# Patient Record
Sex: FEMALE | Race: BLACK OR AFRICAN AMERICAN | NOT HISPANIC OR LATINO | Employment: STUDENT | URBAN - METROPOLITAN AREA
[De-identification: names, ages, dates, MRNs, and addresses within clinical notes are randomized per-mention and may not be internally consistent; named-entity substitution may affect disease eponyms.]

---

## 2024-09-21 ENCOUNTER — HOSPITAL ENCOUNTER (EMERGENCY)
Facility: CLINIC | Age: 19
Discharge: HOME | End: 2024-09-21
Attending: EMERGENCY MEDICINE | Admitting: EMERGENCY MEDICINE
Payer: COMMERCIAL

## 2024-09-21 ENCOUNTER — APPOINTMENT (OUTPATIENT)
Dept: GENERAL RADIOLOGY | Facility: CLINIC | Age: 19
End: 2024-09-21
Attending: PHYSICIAN ASSISTANT
Payer: COMMERCIAL

## 2024-09-21 VITALS
RESPIRATION RATE: 15 BRPM | HEART RATE: 92 BPM | DIASTOLIC BLOOD PRESSURE: 75 MMHG | OXYGEN SATURATION: 98 % | TEMPERATURE: 98 F | SYSTOLIC BLOOD PRESSURE: 120 MMHG

## 2024-09-21 DIAGNOSIS — M25.319 SHOULDER INSTABILITY: ICD-10-CM

## 2024-09-21 PROCEDURE — 73030 X-RAY EXAM OF SHOULDER: CPT | Mod: RT

## 2024-09-21 PROCEDURE — 99283 EMERGENCY DEPT VISIT LOW MDM: CPT | Performed by: EMERGENCY MEDICINE

## 2024-09-21 PROCEDURE — 73030 X-RAY EXAM OF SHOULDER: CPT | Mod: 26 | Performed by: RADIOLOGY

## 2024-09-21 ASSESSMENT — COLUMBIA-SUICIDE SEVERITY RATING SCALE - C-SSRS
1. IN THE PAST MONTH, HAVE YOU WISHED YOU WERE DEAD OR WISHED YOU COULD GO TO SLEEP AND NOT WAKE UP?: NO
6. HAVE YOU EVER DONE ANYTHING, STARTED TO DO ANYTHING, OR PREPARED TO DO ANYTHING TO END YOUR LIFE?: NO
2. HAVE YOU ACTUALLY HAD ANY THOUGHTS OF KILLING YOURSELF IN THE PAST MONTH?: NO

## 2024-09-21 ASSESSMENT — ACTIVITIES OF DAILY LIVING (ADL)
ADLS_ACUITY_SCORE: 33
ADLS_ACUITY_SCORE: 33

## 2024-09-21 NOTE — ED PROVIDER NOTES
"    [facility] DE[facility] ([facility])    9/21/24       ED PROVIDER NOTE    History     Chief Complaint   Patient presents with    Shoulder Injury     HPI  Charles Nation is a 18 year old female who presents to the ED for evaluation of shoulder difficulties.  She presents alone.  She notes over the last 2 to 3 days she has been experiencing instability sensation of the right shoulder joint.  She notes a \"popping\" sound intermittently over the last few days as well.  No injury to the shoulder.  She is right-hand dominant denies any numbness or tingling denies any fevers chest pain dyspnea or back pain.  She has not had shoulder problems in the past.  No history of overuse injury.  She is a student at the [employer].    Past Medical History  No past medical history on file.  No past surgical history on file.  No current outpatient medications on file.    Allergies   Allergen Reactions    Ceftriaxone     Seasonal Allergies     Watermelon [Citrullus Vulgaris]      Family History  No family history on file.  Social History       A complete review of systems was performed with pertinent positives and negatives noted in the HPI, and all other systems negative.    Physical Exam   BP: 120/75  Pulse: 92  Temp: 98  F (36.7  C)  Resp: 15  SpO2: 98 %  Physical Exam  GENERAL APPEARANCE: The patient is well developed, well appearing, and in no acute distress.  HEAD:  Normocephalic and atraumatic.   EENT: Voice normal.  NECK: Trachea is midline.No lymphadenopathy or tenderness.  LUNGS: Breath sounds are equal and clear bilaterally. No wheezes, rhonchi, or rales.  HEART: Regular rate and normal rhythm.    EXTREMITIES: Right shoulder without deformity.  She has full range of motion with abduction and abduction of the right shoulder noted on exam.   strength out of 5 bilaterally.  Radial pulse 2+ on the right.  No sensation tact distal aspect digits 1 through 5 right hand.  NEUROLOGIC: No focal sensory or " motor deficits are noted.  PSYCHIATRIC: The patient is awake, alert.  Appropriate mood and affect.  SKIN: Warm, dry, and well perfused. Good turgor.    Right shoulder without abnormality    ED Course, Procedures, & Data           Results for orders placed or performed during the hospital encounter of 09/21/24   XR Shoulder Right G/E 3 Views     Status: None    Narrative    EXAM: XR SHOULDER RIGHT G/E 3 VIEWS  LOCATION: [facility]  DATE: 9/21/2024    INDICATION: pain  COMPARISON: None.      Impression    IMPRESSION: Normal joint spaces and alignment. No fracture.     Medications - No data to display  Labs Ordered and Resulted from Time of ED Arrival to Time of ED Departure - No data to display  XR Shoulder Right G/E 3 Views   Final Result   IMPRESSION: Normal joint spaces and alignment. No fracture.             Critical care was not performed.     Medical Decision Making  The patient's presentation was of moderate complexity (an undiagnosed new problem with uncertain prognosis).    The patient's evaluation involved:  ordering and/or review of 1 test(s) in this encounter (see separate area of note for details)    The patient's management necessitated only low risk treatment.    Assessment & Plan    This is a 18-year-old female present with concerns for several days of right shoulder difficulties in the absence of any trauma, numbness, systemic symptoms.  She has reassuring vitals and presentation.  Exam shows no acute deformity of the shoulder is good range of motion with abduction and adduction.  She has intact strength in the upper extremities with intact radial pulse and sensation.  Differential considered includes possibility of rotator cuff arthropathy, strain, fracture dislocation amongst others.  Low suspicion fracture dislocation without trauma or significant decreased range of motion.  Initiate radiographs to further evaluate.  Radiographs obtained reviewed  without fracture or dislocation.  Jorge A with patient her symptoms do appear consistent with instability of the shoulder joint.  We discussed recommendations of following up with orthopedics.  Referral placed.  She is given sling for comfort.  We discussed light activity until follow-up.  We discussed return precautions.  Patient has no other questions or concerns at this time.  Red flag signs were addressed, and they were in agreement with the patient care plan provided.    Patient seen and discussed with attending physician , who agrees with my plan of care.    --    ED Attending Physician Attestation    I Paulino Park DO, cared for this patient with the Advanced Practice Provider (LATOYA). I personally provided a substantive portion of the care for this patient, including approving the care plan for the number and complexity of problems addressed and taking responsibility related to the risk of complications and/or morbidity or mortality of patient management. Please see the LATOYA's documentation for full details.          Paulino Park DO  Emergency Medicine      I have reviewed the nursing notes. I have reviewed the findings, diagnosis, plan and need for follow up with the patient.    There are no discharge medications for this patient.      Final diagnoses:   Shoulder instability     Sue Stevenson [facility] EMERGENCY DEPARTMENT  9/21/2024     Sue Stevenson PA-C  09/21/24 [id number]       Paulino Park DO  09/22/24 [id number]

## 2024-09-21 NOTE — ED TRIAGE NOTES
Pt ambulatory to triage with c/o right shoulder pain. Pt states that for the past few days she has been feeling her shoulder pop out of the socket. Pt is worried she may have dislocated her right shoulder.      Triage Assessment (Adult)       Row Name 09/21/24 7007          Triage Assessment    Airway WDL WDL        Respiratory WDL    Respiratory WDL WDL        Skin Circulation/Temperature WDL    Skin Circulation/Temperature WDL WDL        Cardiac WDL    Cardiac WDL WDL        Peripheral/Neurovascular WDL    Peripheral Neurovascular WDL WDL        Cognitive/Neuro/Behavioral WDL    Cognitive/Neuro/Behavioral WDL WDL

## 2024-09-22 NOTE — DISCHARGE INSTRUCTIONS
Here in the emergency room you have negative x-rays of your shoulder.  We discussed you may have some laxity instability in the shoulder joint.  I placed a referral to pelvic orthopedics.  You are given a sling for comfort.  He can use Tylenol ibuprofen heat and ice as needed.  We discussed activity as tolerated.    If you develop any new or worsening symptoms, is important to return right away to the emergency department for further evaluation and management.

## 2024-09-27 ENCOUNTER — PRE VISIT (OUTPATIENT)
Dept: ORTHOPEDICS | Facility: CLINIC | Age: 19
End: 2024-09-27

## 2024-09-27 ENCOUNTER — OFFICE VISIT (OUTPATIENT)
Dept: ORTHOPEDICS | Facility: CLINIC | Age: 19
End: 2024-09-27
Payer: COMMERCIAL

## 2024-09-27 DIAGNOSIS — M25.511 ACUTE PAIN OF RIGHT SHOULDER: Primary | ICD-10-CM

## 2024-09-27 DIAGNOSIS — M24.9 HYPERMOBILE JOINTS: ICD-10-CM

## 2024-09-27 PROCEDURE — 99203 OFFICE O/P NEW LOW 30 MIN: CPT | Performed by: FAMILY MEDICINE

## 2024-09-27 NOTE — LETTER
9/27/2024      RE: Charles Nation  [address]  [place] [address]     Dear Colleague,    Thank you for referring your patient, Charles Nation, to the [facility]. Please see a copy of my visit note below.    CHIEF COMPLAINT:  Pain of the Right Shoulder     HISTORY OF PRESENT ILLNESS  Ms. Nation is a pleasant 18 year old year old female who presents to clinic today with left shoulder victoria.  Charles explains that last weekend she thought she subluxed her shoulder.  She was sitting when she felt her shoulder feel like it slipped out of the joint.      She felt severe pain and inability to move her shoulder in any planes after this.  She has had little to no issues with her shoulders in the past.  She does however have a history of hypermobility diagnosed through a high Beighton score.  She was ruled out for Marfan syndrome however she was not tested with genetics for other connective tissue disorders or EDS.    She notes since being in the sling for a couple of days her shoulder is feeling improved and her motion has returned.  She only has pain with specific rotational activities.  She is hoping to learn additional treatment options that can help prevent this from happening again.    Onset: sudden  Location: left shoulder  Quality:  aching and dull  Duration: 9/21/24  Severity: 6/10 at worst  Timing:intermittent episodes   Modifying factors:  resting and non-use makes it better, movement and use makes it worse  Associated signs & symptoms: pain  Previous similar pain: No  Treatments to date:Tylenol PRN     Additional history: as documented    Review of Systems:  Have you recently had a a fever, chills, weight loss? No  Do you have any vision problems? No  Do you have any chest pain or edema? No  Do you have any shortness of breath or wheezing?  No  Do you have stomach problems? No  Do you have any numbness or focal weakness? No  Do you have diabetes? No  Do  you have problems with bleeding or clotting? No  Do you have an rashes or other skin lesions? No    MEDICAL HISTORY  There is no problem list on file for this patient.      No current outpatient medications on file.       Allergies   Allergen Reactions     Ceftriaxone      Seasonal Allergies      Watermelon [Citrullus Vulgaris]        No family history on file.    Additional medical/Social/Surgical histories reviewed in [facility] and updated as appropriate.    PHYSICAL EXAM  There were no vitals taken for this visit.    General  - normal appearance, in no obvious distress  Musculoskeletal -right shoulder  - inspection: normal bone and joint alignment, no obvious deformity, no scapular winging, no AC step-off  - palpation: Tender to palpation at anterior joint line of right shoulder, normal clavicle, non-tender AC  - ROM:  180 deg flexion   45 deg extension   150 deg abduction   95 deg ER   75 deg IR  - strength: 5/5  strength, 5/5 in all shoulder planes  - special tests:  (-) Speed's  (-) Neer  (-) Hawkin's  (-) [place]arena  (-) [place]'s  (-) apprehension  (-) subscap lift-off  Neuro  - no sensory or motor deficit, grossly normal coordination, normal muscle tone    IMAGING : X-ray shoulder right 4 views. Final results and radiologist's interpretation, available in the [facility] health record. Images were reviewed with the patient/family members in the office today. My personal interpretation of the performed imaging is no acute osseous abnormality or significant degenerative changes.  Shoulder joint is well aligned.     ASSESSMENT & PLAN  Ms. Nation is a 18 year old year old female who presents to clinic today with acute severe right pain and inability to move her shoulder in the setting of known hypermobility.    Possible shoulder subluxation versus other.  No considerable deficits on exam today aside from joint line tenderness.    Diagnosis:   Acute pain of right shoulder  History of hypermobility    We discussed treatment  options including relative rest for the shoulder and starting initially isometric strengthening followed by range of motion and progressive dynamic strengthening to improve rotator cuff strength.  We discussed strengthening rotator cuff is paramount for improving shoulder stability.  She will see one of our physical therapist at the [facility], near campus where she lives.  If she has any additional events, she can return and we can certainly discuss additional advanced imaging, however I do not believe that advanced imaging would not be needed at this time.    As pain is improved, we discussed OTC analgesics would be adequate and she can use heat/ice.    It was a pleasure seeing Charles today.    Srinivasa Madden DO, [facility]  Primary Care Sports Medicine      Again, thank you for allowing me to participate in the care of your patient.      Sincerely,    Srinivasa Madden DO

## 2024-09-27 NOTE — PROGRESS NOTES
CHIEF COMPLAINT:  Pain of the Right Shoulder     HISTORY OF PRESENT ILLNESS  Ms. Nation is a pleasant 18 year old year old female who presents to clinic today with left shoulder victoria.  Charles explains that last weekend she thought she subluxed her shoulder.  She was sitting when she felt her shoulder feel like it slipped out of the joint.      She felt severe pain and inability to move her shoulder in any planes after this.  She has had little to no issues with her shoulders in the past.  She does however have a history of hypermobility diagnosed through a high Beighton score.  She was ruled out for Marfan syndrome however she was not tested with genetics for other connective tissue disorders or EDS.    She notes since being in the sling for a couple of days her shoulder is feeling improved and her motion has returned.  She only has pain with specific rotational activities.  She is hoping to learn additional treatment options that can help prevent this from happening again.    Onset: sudden  Location: left shoulder  Quality:  aching and dull  Duration: 9/21/24  Severity: 6/10 at worst  Timing:intermittent episodes   Modifying factors:  resting and non-use makes it better, movement and use makes it worse  Associated signs & symptoms: pain  Previous similar pain: No  Treatments to date:Tylenol PRN     Additional history: as documented    Review of Systems:  Have you recently had a a fever, chills, weight loss? No  Do you have any vision problems? No  Do you have any chest pain or edema? No  Do you have any shortness of breath or wheezing?  No  Do you have stomach problems? No  Do you have any numbness or focal weakness? No  Do you have diabetes? No  Do you have problems with bleeding or clotting? No  Do you have an rashes or other skin lesions? No    MEDICAL HISTORY  There is no problem list on file for this patient.      No current outpatient medications on file.       Allergies   Allergen Reactions    Ceftriaxone      Seasonal Allergies     Watermelon [Citrullus Vulgaris]        No family history on file.    Additional medical/Social/Surgical histories reviewed in [facility] and updated as appropriate.    PHYSICAL EXAM  There were no vitals taken for this visit.    General  - normal appearance, in no obvious distress  Musculoskeletal -right shoulder  - inspection: normal bone and joint alignment, no obvious deformity, no scapular winging, no AC step-off  - palpation: Tender to palpation at anterior joint line of right shoulder, normal clavicle, non-tender AC  - ROM:  180 deg flexion   45 deg extension   150 deg abduction   95 deg ER   75 deg IR  - strength: 5/5  strength, 5/5 in all shoulder planes  - special tests:  (-) Speed's  (-) Neer  (-) Hawkin's  (-) Macarean  (-) [place]'s  (-) apprehension  (-) subscap lift-off  Neuro  - no sensory or motor deficit, grossly normal coordination, normal muscle tone    IMAGING : X-ray shoulder right 4 views. Final results and radiologist's interpretation, available in the [facility] health record. Images were reviewed with the patient/family members in the office today. My personal interpretation of the performed imaging is no acute osseous abnormality or significant degenerative changes.  Shoulder joint is well aligned.     ASSESSMENT & PLAN  Ms. Nation is a 18 year old year old female who presents to clinic today with acute severe right pain and inability to move her shoulder in the setting of known hypermobility.    Possible shoulder subluxation versus other.  No considerable deficits on exam today aside from joint line tenderness.    Diagnosis:   Acute pain of right shoulder  History of hypermobility    We discussed treatment options including relative rest for the shoulder and starting initially isometric strengthening followed by range of motion and progressive dynamic strengthening to improve rotator cuff strength.  We discussed strengthening rotator cuff is paramount for improving shoulder  stability.  She will see one of our physical therapist at the [facility], near campus where she lives.  If she has any additional events, she can return and we can certainly discuss additional advanced imaging, however I do not believe that advanced imaging would not be needed at this time.    As pain is improved, we discussed OTC analgesics would be adequate and she can use heat/ice.    It was a pleasure seeing Charles today.    Srinivasa Madden DO, CAQSM  Primary Care Sports Medicine

## 2024-10-06 ENCOUNTER — HEALTH MAINTENANCE LETTER (OUTPATIENT)
Age: 19
End: 2024-10-06

## 2024-10-10 ENCOUNTER — THERAPY VISIT (OUTPATIENT)
Dept: PHYSICAL THERAPY | Facility: CLINIC | Age: 19
End: 2024-10-10
Payer: COMMERCIAL

## 2024-10-10 DIAGNOSIS — M62.81 MUSCLE WEAKNESS (GENERALIZED): Primary | ICD-10-CM

## 2024-10-10 DIAGNOSIS — M25.511 ACUTE PAIN OF RIGHT SHOULDER: ICD-10-CM

## 2024-10-10 PROCEDURE — 97110 THERAPEUTIC EXERCISES: CPT | Mod: GP

## 2024-10-10 PROCEDURE — 97161 PT EVAL LOW COMPLEX 20 MIN: CPT | Mod: GP

## 2024-10-10 ASSESSMENT — ACTIVITIES OF DAILY LIVING (ADL)
WASHING_YOUR_BACK: 2
REACHING_FOR_SOMETHING_ON_A_HIGH_SHELF: 4
WASHING_YOUR_HAIR?: 2
AT_ITS_WORST?: 6
PUTTING_ON_A_SHIRT_THAT_BUTTONS_DOWN_THE_FRONT: 0
PUTTING_ON_AN_UNDERSHIRT_OR_A_PULLOVER_SWEATER: 3
PUTTING_ON_YOUR_PANTS: 0
PLEASE_INDICATE_YOR_PRIMARY_REASON_FOR_REFERRAL_TO_THERAPY:: SHOULDER
PUSHING_WITH_THE_INVOLVED_ARM: 4
TOUCHING_THE_BACK_OF_YOUR_NECK: 3
CARRYING_A_HEAVY_OBJECT_OF_10_POUNDS: 2
REMOVING_SOMETHING_FROM_YOUR_BACK_POCKET: 0
WHEN_LYING_ON_THE_INVOLVED_SIDE: 3
PLACING_AN_OBJECT_ON_A_HIGH_SHELF: 2

## 2024-10-10 NOTE — PROGRESS NOTES
"PHYSICAL THERAPY EVALUATION  Type of Visit: Evaluation              Subjective   Pt states that she has been having complaints of R shoulder subluxations. She went to the ER due to the significant amount of pain she was in and felt that her shoulder was dislocated. Per her reporting she is a 9/9 on the Beighton scale. She is a student at the [employer]      Presenting condition or subjective complaint: shoulder instability  Date of onset:      Relevant medical history: Depression; Dizziness; Mental Illness; Migraines or headaches; Pain at night or rest; Severe headaches   Dates & types of surgery: Appendectomy, 10/2020    Prior diagnostic imaging/testing results: X-ray     Prior therapy history for the same diagnosis, illness or injury: No      Living Environment  Social support: Alone   Type of home: Apartment/condo; Other   Stairs to enter the home: No       Ramp: No   Stairs inside the home: No       Help at home: None  Equipment owned:       Employment: Not Applicable    Hobbies/Interests: [employer], theater, biking, yoga, reading, liana    Patient goals for therapy: keep my shoulder stable, reduce pain    Pain assessment: Pain present  Pain at Worst: 6/10; dull aching pain with some shooting down her arm.  Pain at Best: 0/10; if not moving it  Pain Currently: 2/10; \"sore today\"    Objective   UE ROM:  L:  -Flexion: WNL  -Abduction: WNL  -IR: WNL  -ER: WNL  R:  -Flexion: WNL  -Abduction: WNL  -IR: WNL  -ER: WNL    UE MMT:  L:  -Scaption: 12, 11, 10.6  -ER: 12.9, 11, 11.6  -IR: 12.8, 11, 13.4  R:  -Scaption: 11.6, 10.3, 9.5  -ER: 12.5, 12.3, 11.8  -IR: 10.9, 12.1, 13.5    Assessment & Plan   CLINICAL IMPRESSIONS  Medical Diagnosis: Acute Pain of Right Shoulder    Treatment Diagnosis:     Impression/Assessment: Patient is a 18 year old female with R shoulder pain and subluxation complaints.  The following significant findings have been identified: Pain, Decreased joint mobility, and Decreased " strength. These impairments interfere with their ability to perform self care tasks, work tasks, recreational activities, and household chores as compared to previous level of function.     Clinical Decision Making (Complexity):  Clinical Presentation: Stable/Uncomplicated  Clinical Presentation Rationale: based on medical and personal factors listed in PT evaluation  Clinical Decision Making (Complexity): Low complexity    PLAN OF CARE  Treatment Interventions:  Modalities: Cryotherapy, Hot Pack  Interventions: Manual Therapy, Neuromuscular Re-education, Therapeutic Activity, Therapeutic Exercise    Long Term Goals            Frequency of Treatment: 1-2x/week  Duration of Treatment: 12 weeks    Recommended Referrals to Other Professionals:  none  Education Assessment:   Learner/Method: Patient  Education Comments: no questions at this time    Risks and benefits of evaluation/treatment have been explained.   Patient/Family/caregiver agrees with Plan of Care.     Evaluation Time:           Signing Clinician: Paulino Martin PT

## 2024-10-25 ENCOUNTER — THERAPY VISIT (OUTPATIENT)
Dept: PHYSICAL THERAPY | Facility: CLINIC | Age: 19
End: 2024-10-25
Payer: COMMERCIAL

## 2024-10-25 DIAGNOSIS — M62.81 MUSCLE WEAKNESS (GENERALIZED): ICD-10-CM

## 2024-10-25 DIAGNOSIS — M25.511 ACUTE PAIN OF RIGHT SHOULDER: Primary | ICD-10-CM

## 2024-10-25 PROCEDURE — 97530 THERAPEUTIC ACTIVITIES: CPT | Mod: GP

## 2024-10-25 PROCEDURE — 97110 THERAPEUTIC EXERCISES: CPT | Mod: GP

## 2024-10-31 ENCOUNTER — THERAPY VISIT (OUTPATIENT)
Dept: PHYSICAL THERAPY | Facility: CLINIC | Age: 19
End: 2024-10-31
Payer: COMMERCIAL

## 2024-10-31 DIAGNOSIS — M25.511 ACUTE PAIN OF RIGHT SHOULDER: Primary | ICD-10-CM

## 2024-10-31 DIAGNOSIS — M62.81 MUSCLE WEAKNESS (GENERALIZED): ICD-10-CM

## 2024-10-31 PROCEDURE — 97110 THERAPEUTIC EXERCISES: CPT | Mod: GP

## 2024-11-07 ENCOUNTER — THERAPY VISIT (OUTPATIENT)
Dept: PHYSICAL THERAPY | Facility: CLINIC | Age: 19
End: 2024-11-07
Payer: COMMERCIAL

## 2024-11-07 DIAGNOSIS — M62.81 MUSCLE WEAKNESS (GENERALIZED): ICD-10-CM

## 2024-11-07 DIAGNOSIS — M25.511 ACUTE PAIN OF RIGHT SHOULDER: Primary | ICD-10-CM

## 2024-11-07 PROCEDURE — 97110 THERAPEUTIC EXERCISES: CPT | Mod: GP

## 2024-11-07 PROCEDURE — 97140 MANUAL THERAPY 1/> REGIONS: CPT | Mod: GP

## 2024-11-14 ENCOUNTER — THERAPY VISIT (OUTPATIENT)
Dept: PHYSICAL THERAPY | Facility: CLINIC | Age: 19
End: 2024-11-14
Payer: COMMERCIAL

## 2024-11-14 DIAGNOSIS — M25.511 ACUTE PAIN OF RIGHT SHOULDER: Primary | ICD-10-CM

## 2024-11-14 DIAGNOSIS — M62.81 MUSCLE WEAKNESS (GENERALIZED): ICD-10-CM

## 2024-11-14 PROCEDURE — 97140 MANUAL THERAPY 1/> REGIONS: CPT | Mod: GP

## 2024-11-14 PROCEDURE — 97110 THERAPEUTIC EXERCISES: CPT | Mod: GP

## 2024-11-21 ENCOUNTER — THERAPY VISIT (OUTPATIENT)
Dept: PHYSICAL THERAPY | Facility: CLINIC | Age: 19
End: 2024-11-21
Payer: COMMERCIAL

## 2024-11-21 DIAGNOSIS — M25.511 ACUTE PAIN OF RIGHT SHOULDER: Primary | ICD-10-CM

## 2024-11-21 DIAGNOSIS — M62.81 MUSCLE WEAKNESS (GENERALIZED): ICD-10-CM

## 2024-12-05 ENCOUNTER — THERAPY VISIT (OUTPATIENT)
Dept: PHYSICAL THERAPY | Facility: CLINIC | Age: 19
End: 2024-12-05
Payer: COMMERCIAL

## 2024-12-05 DIAGNOSIS — M25.511 ACUTE PAIN OF RIGHT SHOULDER: Primary | ICD-10-CM

## 2024-12-05 DIAGNOSIS — M62.81 MUSCLE WEAKNESS (GENERALIZED): ICD-10-CM

## 2024-12-12 ENCOUNTER — THERAPY VISIT (OUTPATIENT)
Dept: PHYSICAL THERAPY | Facility: CLINIC | Age: 19
End: 2024-12-12
Payer: COMMERCIAL

## 2024-12-12 DIAGNOSIS — M25.511 ACUTE PAIN OF RIGHT SHOULDER: Primary | ICD-10-CM

## 2024-12-12 DIAGNOSIS — M62.81 MUSCLE WEAKNESS (GENERALIZED): ICD-10-CM

## 2024-12-19 ENCOUNTER — THERAPY VISIT (OUTPATIENT)
Dept: PHYSICAL THERAPY | Facility: CLINIC | Age: 19
End: 2024-12-19
Payer: COMMERCIAL

## 2024-12-19 DIAGNOSIS — M62.81 MUSCLE WEAKNESS (GENERALIZED): ICD-10-CM

## 2024-12-19 DIAGNOSIS — M25.511 ACUTE PAIN OF RIGHT SHOULDER: Primary | ICD-10-CM

## 2025-01-23 ENCOUNTER — THERAPY VISIT (OUTPATIENT)
Dept: PHYSICAL THERAPY | Facility: CLINIC | Age: 20
End: 2025-01-23
Payer: COMMERCIAL

## 2025-01-23 DIAGNOSIS — M62.81 MUSCLE WEAKNESS (GENERALIZED): ICD-10-CM

## 2025-01-23 DIAGNOSIS — M25.511 ACUTE PAIN OF RIGHT SHOULDER: Primary | ICD-10-CM

## 2025-01-30 ENCOUNTER — THERAPY VISIT (OUTPATIENT)
Dept: PHYSICAL THERAPY | Facility: CLINIC | Age: 20
End: 2025-01-30
Payer: COMMERCIAL

## 2025-01-30 DIAGNOSIS — M62.81 MUSCLE WEAKNESS (GENERALIZED): ICD-10-CM

## 2025-01-30 DIAGNOSIS — M25.511 ACUTE PAIN OF RIGHT SHOULDER: Primary | ICD-10-CM

## 2025-02-06 ENCOUNTER — THERAPY VISIT (OUTPATIENT)
Dept: PHYSICAL THERAPY | Facility: CLINIC | Age: 20
End: 2025-02-06
Payer: COMMERCIAL

## 2025-02-06 DIAGNOSIS — M25.511 ACUTE PAIN OF RIGHT SHOULDER: Primary | ICD-10-CM

## 2025-02-06 DIAGNOSIS — M62.81 MUSCLE WEAKNESS (GENERALIZED): ICD-10-CM

## 2025-02-12 ENCOUNTER — TRANSCRIBE ORDERS (OUTPATIENT)
Facility: CLINIC | Age: 20
End: 2025-02-12
Payer: COMMERCIAL

## 2025-02-12 DIAGNOSIS — G89.29 OTHER CHRONIC PAIN: Primary | ICD-10-CM

## 2025-02-12 DIAGNOSIS — M79.18 MYALGIA, OTHER SITE: ICD-10-CM

## 2025-02-12 DIAGNOSIS — G43.909 MIGRAINE: ICD-10-CM

## 2025-02-12 DIAGNOSIS — M54.81 OCCIPITAL NEURALGIA: ICD-10-CM

## 2025-02-12 DIAGNOSIS — M54.12 RADICULOPATHY, CERVICAL: ICD-10-CM

## 2025-02-20 ENCOUNTER — THERAPY VISIT (OUTPATIENT)
Dept: PHYSICAL THERAPY | Facility: CLINIC | Age: 20
End: 2025-02-20
Payer: COMMERCIAL

## 2025-02-20 DIAGNOSIS — M25.511 ACUTE PAIN OF RIGHT SHOULDER: Primary | ICD-10-CM

## 2025-02-20 DIAGNOSIS — M62.81 MUSCLE WEAKNESS (GENERALIZED): ICD-10-CM

## 2025-04-01 ENCOUNTER — THERAPY VISIT (OUTPATIENT)
Dept: PHYSICAL THERAPY | Facility: CLINIC | Age: 20
End: 2025-04-01
Payer: COMMERCIAL

## 2025-04-01 DIAGNOSIS — M62.81 MUSCLE WEAKNESS (GENERALIZED): ICD-10-CM

## 2025-04-01 DIAGNOSIS — M25.511 ACUTE PAIN OF RIGHT SHOULDER: Primary | ICD-10-CM

## 2025-04-01 PROCEDURE — 97110 THERAPEUTIC EXERCISES: CPT | Mod: GP

## 2025-04-01 PROCEDURE — 97112 NEUROMUSCULAR REEDUCATION: CPT | Mod: GP

## 2025-04-09 ENCOUNTER — HOSPITAL ENCOUNTER (EMERGENCY)
Facility: CLINIC | Age: 20
Discharge: HOME | End: 2025-04-09
Admitting: PHYSICIAN ASSISTANT
Payer: COMMERCIAL

## 2025-04-09 VITALS
TEMPERATURE: 97.9 F | HEART RATE: 82 BPM | SYSTOLIC BLOOD PRESSURE: 104 MMHG | DIASTOLIC BLOOD PRESSURE: 57 MMHG | RESPIRATION RATE: 16 BRPM | OXYGEN SATURATION: 98 %

## 2025-04-09 DIAGNOSIS — G43.909 MIGRAINE: ICD-10-CM

## 2025-04-09 PROCEDURE — 99284 EMERGENCY DEPT VISIT MOD MDM: CPT | Performed by: PHYSICIAN ASSISTANT

## 2025-04-09 PROCEDURE — 96374 THER/PROPH/DIAG INJ IV PUSH: CPT | Performed by: PHYSICIAN ASSISTANT

## 2025-04-09 PROCEDURE — 96361 HYDRATE IV INFUSION ADD-ON: CPT | Performed by: PHYSICIAN ASSISTANT

## 2025-04-09 PROCEDURE — 96375 TX/PRO/DX INJ NEW DRUG ADDON: CPT | Performed by: PHYSICIAN ASSISTANT

## 2025-04-09 PROCEDURE — 258N000003 HC RX IP 258 OP 636: Performed by: PHYSICIAN ASSISTANT

## 2025-04-09 PROCEDURE — 99284 EMERGENCY DEPT VISIT MOD MDM: CPT | Mod: 25 | Performed by: PHYSICIAN ASSISTANT

## 2025-04-09 PROCEDURE — 250N000011 HC RX IP 250 OP 636: Mod: JZ | Performed by: PHYSICIAN ASSISTANT

## 2025-04-09 RX ORDER — METOCLOPRAMIDE HYDROCHLORIDE 5 MG/ML
10 INJECTION INTRAMUSCULAR; INTRAVENOUS ONCE
Status: COMPLETED | OUTPATIENT
Start: 2025-04-09 | End: 2025-04-09

## 2025-04-09 RX ORDER — KETOROLAC TROMETHAMINE 15 MG/ML
15 INJECTION, SOLUTION INTRAMUSCULAR; INTRAVENOUS ONCE
Status: COMPLETED | OUTPATIENT
Start: 2025-04-09 | End: 2025-04-09

## 2025-04-09 RX ADMIN — SODIUM CHLORIDE 1000 ML: 9 INJECTION, SOLUTION INTRAVENOUS at 18:16

## 2025-04-09 RX ADMIN — KETOROLAC TROMETHAMINE 15 MG: 15 INJECTION, SOLUTION INTRAMUSCULAR; INTRAVENOUS at 18:16

## 2025-04-09 RX ADMIN — METOCLOPRAMIDE 10 MG: 5 INJECTION, SOLUTION INTRAMUSCULAR; INTRAVENOUS at 18:16

## 2025-04-09 ASSESSMENT — COLUMBIA-SUICIDE SEVERITY RATING SCALE - C-SSRS
2. HAVE YOU ACTUALLY HAD ANY THOUGHTS OF KILLING YOURSELF IN THE PAST MONTH?: NO
6. HAVE YOU EVER DONE ANYTHING, STARTED TO DO ANYTHING, OR PREPARED TO DO ANYTHING TO END YOUR LIFE?: NO
1. IN THE PAST MONTH, HAVE YOU WISHED YOU WERE DEAD OR WISHED YOU COULD GO TO SLEEP AND NOT WAKE UP?: NO

## 2025-04-09 ASSESSMENT — ACTIVITIES OF DAILY LIVING (ADL)
ADLS_ACUITY_SCORE: 41
ADLS_ACUITY_SCORE: 41

## 2025-04-09 NOTE — ED TRIAGE NOTES
PT arrives from home with migraine uncontrolled by her home medications for 3 days. PT has a hx of occipital neuralgia. Denies vision changes, n/v.     Triage Assessment (Adult)       Row Name 04/09/25 1672          Triage Assessment    Airway WDL WDL        Respiratory WDL    Respiratory WDL WDL        Skin Circulation/Temperature WDL    Skin Circulation/Temperature WDL WDL        Cardiac WDL    Cardiac WDL WDL        Peripheral/Neurovascular WDL    Peripheral Neurovascular WDL WDL        Cognitive/Neuro/Behavioral WDL    Cognitive/Neuro/Behavioral WDL WDL

## 2025-04-09 NOTE — ED PROVIDER NOTES
ED Provider Note  [facility]      History     Chief Complaint   Patient presents with    Headache     HPI  18yo F pmhx migraines p/w gradual onset, progressive worsening HA consistent with historical migraines, but acutely refractory to her sumatriptan and Excedrin.  Onset 3 days ago.  Pain focal to occiput, rating up over the crown of head.  Associated with intermittent facial paresthesias and photophobia which patient reports is common with her migraines.  Denies fever, chills, neck pain, extremity paresthesias or weakness.    Past Medical History  No past medical history on file.  No past surgical history on file.  No current outpatient medications on file.    Allergies   Allergen Reactions    Ceftriaxone     Seasonal Allergies     Watermelon [Citrullus Vulgaris]      Family History  No family history on file.  Social History       A medically appropriate review of systems was performed with pertinent positives and negatives noted in the HPI, and all other systems negative.    Physical Exam   BP: 133/82  Pulse: 71  Temp: 98  F (36.7  C)  Resp: 14  SpO2: 97 %  Physical Exam  Constitutional:       General: She is not in acute distress.     Appearance: Normal appearance. She is not diaphoretic.   HENT:      Head: Atraumatic.      Mouth/Throat:      Mouth: Mucous membranes are moist.   Eyes:      Conjunctiva/sclera: Conjunctivae normal.   Cardiovascular:      Rate and Rhythm: Normal rate.   Pulmonary:      Effort: No respiratory distress.   Abdominal:      General: Abdomen is flat.   Musculoskeletal:      Cervical back: Neck supple. No pain with movement, spinous process tenderness or muscular tenderness.   Skin:     General: Skin is warm.   Neurological:      Mental Status: She is alert and oriented to person, place, and time.      Cranial Nerves: Cranial nerves 2-12 are intact.      Sensory: Sensation is intact.      Motor: Motor function is intact.      Coordination: Coordination is  intact.           ED Course, Procedures, & Data      Procedures                No results found for any visits on 04/09/25.  Medications   sodium chloride 0.9% BOLUS 1,000 mL (1,000 mLs Intravenous $New Bag 4/9/25 1816)   metoclopramide (REGLAN) injection 10 mg (10 mg Intravenous $Given 4/9/25 1816)   ketorolac (TORADOL) injection 15 mg (15 mg Intravenous $Given 4/9/25 1816)     Labs Ordered and Resulted from Time of ED Arrival to Time of ED Departure - No data to display  No orders to display          Critical care was not performed.     Medical Decision Making  The patient's presentation was of moderate complexity (a chronic illness mild to moderate exacerbation, progression, or side effect of treatment).    The patient's evaluation involved:  review of external note(s) from 3+ sources (see separate area of note for details)  strong consideration of a test (see separate area of note for details) that was ultimately deferred    The patient's management necessitated moderate risk (prescription drug management including medications given in the ED).    Assessment & Plan    20yo F pmhx migraines p/w gradual onset, progressive worsening HA consistent with historical migraines, but acutely refractory to her sumatriptan and Excedrin.  In ED patient is HDS/AF, NAD, well-appearing.  Neck is supple.  No nuchal rigidity.  Neurologically intact.  Symptoms most consistent with acute on chronic migraines.  Low suspicion SAH, SDH, other acute intracranial bleed, intracranial mass, meningitis, venous sinus thrombosis.  Do not feel there is utility in imaging at this time.  Patient treated with IVF, Reglan, ketorolac, with resolution in HA.  Patient discharged with instructions to follow-up with her PCP, and ER return precautions for worsening symptoms.    I have reviewed the nursing notes. I have reviewed the findings, diagnosis, plan and need for follow up with the patient.    New Prescriptions    No medications on file        Final diagnoses:   Migraine         Andrei Brooks PA-C  [facility] EMERGENCY DEPARTMENT  4/9/2025     Andrei Brooks PA-C  04/09/25 1930

## 2025-04-15 ENCOUNTER — THERAPY VISIT (OUTPATIENT)
Dept: PHYSICAL THERAPY | Facility: CLINIC | Age: 20
End: 2025-04-15
Payer: COMMERCIAL

## 2025-04-15 DIAGNOSIS — M62.81 MUSCLE WEAKNESS (GENERALIZED): ICD-10-CM

## 2025-04-15 DIAGNOSIS — M25.511 ACUTE PAIN OF RIGHT SHOULDER: Primary | ICD-10-CM

## 2025-04-15 PROCEDURE — 97112 NEUROMUSCULAR REEDUCATION: CPT | Mod: GP

## 2025-04-15 PROCEDURE — 97110 THERAPEUTIC EXERCISES: CPT | Mod: GP

## 2025-04-29 ENCOUNTER — THERAPY VISIT (OUTPATIENT)
Dept: PHYSICAL THERAPY | Facility: CLINIC | Age: 20
End: 2025-04-29
Payer: COMMERCIAL

## 2025-04-29 DIAGNOSIS — M25.511 ACUTE PAIN OF RIGHT SHOULDER: Primary | ICD-10-CM

## 2025-04-29 DIAGNOSIS — M62.81 MUSCLE WEAKNESS (GENERALIZED): ICD-10-CM

## 2025-04-29 PROCEDURE — 97140 MANUAL THERAPY 1/> REGIONS: CPT | Mod: GP

## 2025-04-29 PROCEDURE — 97110 THERAPEUTIC EXERCISES: CPT | Mod: GP

## 2025-05-24 NOTE — TELEPHONE ENCOUNTER
54.4 DIAGNOSIS: Right shoulder instability/ Sue Stevenson PA-C/ XR/ [facility]/ ortho con    APPOINTMENT DATE: 9/27/24   NOTES STATUS DETAILS   OFFICE NOTE from referring provider Internal 9/21/24 Sue Stevenson PA-C   DISCHARGE REPORT from the ER Internal 9/21/24 ED Paulino Park DO   MEDICATION LIST Internal    XRAYS  & INJECTIONS (IMAGES & REPORTS) Internal 9/21/24 XR shoulder right